# Patient Record
Sex: FEMALE | URBAN - METROPOLITAN AREA
[De-identification: names, ages, dates, MRNs, and addresses within clinical notes are randomized per-mention and may not be internally consistent; named-entity substitution may affect disease eponyms.]

---

## 2020-09-15 ENCOUNTER — APPOINTMENT (OUTPATIENT)
Dept: URBAN - METROPOLITAN AREA CLINIC 271 | Age: 50
Setting detail: DERMATOLOGY
End: 2020-09-16

## 2020-09-15 DIAGNOSIS — L20.89 OTHER ATOPIC DERMATITIS: ICD-10-CM

## 2020-09-15 PROCEDURE — 99202 OFFICE O/P NEW SF 15 MIN: CPT

## 2020-09-15 PROCEDURE — OTHER PRESCRIPTION: OTHER

## 2020-09-15 PROCEDURE — OTHER COUNSELING: OTHER

## 2020-09-15 PROCEDURE — OTHER TREATMENT REGIMEN: OTHER

## 2020-09-15 RX ORDER — CLOBETASOL PROPIONATE 0.5 MG/G
CREAM TOPICAL
Qty: 1 | Refills: 2 | Status: ERX | COMMUNITY
Start: 2020-09-15

## 2020-09-15 RX ORDER — CETIRIZINE HYDROCHLORIDE 10 MG/1
TABLET, FILM COATED ORAL
Qty: 30 | Refills: 3 | Status: ERX | COMMUNITY
Start: 2020-09-15

## 2020-09-15 NOTE — HPI: RASH
What Type Of Note Output Would You Prefer (Optional)?: Bullet Format
How Severe Is Your Rash?: mild
Is This A New Presentation, Or A Follow-Up?: Rash
Additional History: Patient states PCP has treated her with TAC with no improvement, patient states rash appears every spring

## 2020-09-30 ENCOUNTER — APPOINTMENT (OUTPATIENT)
Dept: URBAN - METROPOLITAN AREA CLINIC 271 | Age: 50
Setting detail: DERMATOLOGY
End: 2020-09-30

## 2020-09-30 DIAGNOSIS — L20.89 OTHER ATOPIC DERMATITIS: ICD-10-CM

## 2020-09-30 PROCEDURE — 99213 OFFICE O/P EST LOW 20 MIN: CPT

## 2020-09-30 PROCEDURE — OTHER COUNSELING: OTHER

## 2020-09-30 PROCEDURE — OTHER TREATMENT REGIMEN: OTHER

## 2020-09-30 NOTE — PROCEDURE: TREATMENT REGIMEN
Initiate Treatment: Aquaphor moisturizer bid
Continue Regimen: Clobetasol cream 1x weekly \\nCetirizine
Detail Level: Zone

## 2020-11-03 ENCOUNTER — APPOINTMENT (OUTPATIENT)
Dept: URBAN - METROPOLITAN AREA CLINIC 271 | Age: 50
Setting detail: DERMATOLOGY
End: 2020-11-03

## 2020-11-03 DIAGNOSIS — L71.8 OTHER ROSACEA: ICD-10-CM

## 2020-11-03 DIAGNOSIS — L72.8 OTHER FOLLICULAR CYSTS OF THE SKIN AND SUBCUTANEOUS TISSUE: ICD-10-CM

## 2020-11-03 DIAGNOSIS — L20.89 OTHER ATOPIC DERMATITIS: ICD-10-CM

## 2020-11-03 PROCEDURE — 99213 OFFICE O/P EST LOW 20 MIN: CPT | Mod: 25

## 2020-11-03 PROCEDURE — OTHER INTRALESIONAL KENALOG: OTHER

## 2020-11-03 PROCEDURE — OTHER COUNSELING: OTHER

## 2020-11-03 PROCEDURE — OTHER TREATMENT REGIMEN: OTHER

## 2020-11-03 PROCEDURE — 11900 INJECT SKIN LESIONS </W 7: CPT

## 2020-11-03 PROCEDURE — OTHER PRESCRIPTION: OTHER

## 2020-11-03 RX ORDER — DOXYCYCLINE HYCLATE 50 MG/1
CAPSULE, GELATIN COATED ORAL
Qty: 30 | Refills: 0 | Status: ERX | COMMUNITY
Start: 2020-11-03

## 2020-11-03 RX ORDER — METRONIDAZOLE 10 MG/G
GEL TOPICAL
Qty: 1 | Refills: 2 | Status: ERX | COMMUNITY
Start: 2020-11-03

## 2020-11-03 ASSESSMENT — LOCATION DETAILED DESCRIPTION DERM
LOCATION DETAILED: RIGHT CENTRAL MALAR CHEEK
LOCATION DETAILED: INFERIOR THORACIC SPINE

## 2020-11-03 ASSESSMENT — LOCATION SIMPLE DESCRIPTION DERM
LOCATION SIMPLE: UPPER BACK
LOCATION SIMPLE: RIGHT CHEEK

## 2020-11-03 ASSESSMENT — LOCATION ZONE DERM
LOCATION ZONE: TRUNK
LOCATION ZONE: FACE

## 2020-11-03 NOTE — PROCEDURE: INTRALESIONAL KENALOG
Detail Level: Detailed
Concentration Of Solution Injected (Mg/Ml): 3.0
Kenalog Preparation: Kenalog
Consent: The risks of atrophy were reviewed with the patient.
Include Z78.9 (Other Specified Conditions Influencing Health Status) As An Associated Diagnosis?: No
Total Volume Injected (Ccs- Only Use Numbers And Decimals): 0.3
X Size Of Lesion In Cm (Optional): 0
Administered By (Optional): RN
Medical Necessity Clause: This procedure was medically necessary because the lesions that were treated were:

## 2020-11-03 NOTE — PROCEDURE: TREATMENT REGIMEN
Continue Regimen: Clobetasol cream 1x weekly \\nCetirizine
Initiate Treatment: Aquaphor moisturizer bid
Detail Level: Zone

## 2020-11-03 NOTE — HPI: SKIN LESION
What Type Of Note Output Would You Prefer (Optional)?: Bullet Format
How Severe Is Your Skin Lesion?: moderate
Has Your Skin Lesion Been Treated?: been treated
Is This A New Presentation, Or A Follow-Up?: Skin Lesion
Additional History: Patient reports that lesion has been recurring for years. Patient states that it was treated approximately 4-5 years ago. Patient is unsure exactly what doctor did for treatment, but remembers wearing a bandage. Patient states that lesion is irritated by her bra strap and by picking. Patient will have family member try to squeeze the lesion.

## 2020-11-03 NOTE — HPI: RASH (ROSACEA)
How Severe Is Your Rosacea?: moderate
Is This A New Presentation, Or A Follow-Up?: Rash
Additional History: Patient wakes up with inflammation and redness under eyes every morning. Patient states that skin is sensitive so she’s hesitant to use topical medications. Patient states that she is asked if her blood pressure is elevated because of the redness and puffiness.

## 2021-06-23 ENCOUNTER — APPOINTMENT (OUTPATIENT)
Dept: URBAN - METROPOLITAN AREA CLINIC 271 | Age: 51
Setting detail: DERMATOLOGY
End: 2021-06-23

## 2021-06-23 DIAGNOSIS — L20.89 OTHER ATOPIC DERMATITIS: ICD-10-CM

## 2021-06-23 PROCEDURE — 99213 OFFICE O/P EST LOW 20 MIN: CPT

## 2021-06-23 PROCEDURE — OTHER PRESCRIPTION: OTHER

## 2021-06-23 PROCEDURE — OTHER COUNSELING: OTHER

## 2021-06-23 PROCEDURE — OTHER ADDITIONAL NOTES: OTHER

## 2021-06-23 RX ORDER — CETIRIZINE HCL 10 MG/1
CAPSULE ORAL
Qty: 30 | Refills: 6 | Status: ERX | COMMUNITY
Start: 2021-06-23

## 2021-06-23 RX ORDER — TRIAMCINOLONE ACETONIDE 1 MG/G
CREAM TOPICAL BID
Qty: 1 | Refills: 1 | Status: ERX | COMMUNITY
Start: 2021-06-23

## 2021-06-23 NOTE — PROCEDURE: ADDITIONAL NOTES
Render Risk Assessment In Note?: no
Detail Level: Simple
Additional Notes: Declined kenalog injection due to being diabetic

## 2021-06-23 NOTE — HPI: ITCHING
What Type Of Note Output Would You Prefer (Optional)?: Bullet Format
On A Scale Of 0 To 10 How Would You Rate Your Itching?: 7
How Did Your Itching Occur?: gradual in onset  (over a period of years)
How Severe Is Your Itching?: moderate
Additional History: She stopped taking the cetirizine by she ran out